# Patient Record
Sex: MALE | Race: BLACK OR AFRICAN AMERICAN | Employment: FULL TIME | URBAN - METROPOLITAN AREA
[De-identification: names, ages, dates, MRNs, and addresses within clinical notes are randomized per-mention and may not be internally consistent; named-entity substitution may affect disease eponyms.]

---

## 2017-05-19 PROBLEM — N32.3 BLADDER DIVERTICULUM: Status: ACTIVE | Noted: 2017-05-19

## 2017-05-19 PROBLEM — N32.0 BLADDER OUTLET OBSTRUCTION: Status: ACTIVE | Noted: 2017-05-19

## 2017-08-10 ENCOUNTER — HOSPITAL ENCOUNTER (EMERGENCY)
Age: 62
Discharge: HOME | End: 2017-08-10
Attending: EMERGENCY MEDICINE
Payer: MEDICARE

## 2017-08-10 VITALS
RESPIRATION RATE: 14 BRPM | HEART RATE: 92 BPM | TEMPERATURE: 98.3 F | SYSTOLIC BLOOD PRESSURE: 105 MMHG | DIASTOLIC BLOOD PRESSURE: 73 MMHG | OXYGEN SATURATION: 96 %

## 2017-08-10 DIAGNOSIS — H57.9 SENSATION OF FOREIGN BODY IN EYE: Primary | ICD-10-CM

## 2017-08-10 PROCEDURE — 99283 EMERGENCY DEPT VISIT LOW MDM: CPT

## 2017-08-10 PROCEDURE — 74011000250 HC RX REV CODE- 250: Performed by: PHYSICIAN ASSISTANT

## 2017-08-10 RX ORDER — PROPARACAINE HYDROCHLORIDE 5 MG/ML
1 SOLUTION/ DROPS OPHTHALMIC
Status: COMPLETED | OUTPATIENT
Start: 2017-08-10 | End: 2017-08-10

## 2017-08-10 RX ORDER — ERYTHROMYCIN 5 MG/G
OINTMENT OPHTHALMIC
Qty: 1 G | Refills: 0 | Status: SHIPPED | OUTPATIENT
Start: 2017-08-10 | End: 2017-08-17

## 2017-08-10 RX ADMIN — PROPARACAINE HYDROCHLORIDE 1 DROP: 5 SOLUTION/ DROPS OPHTHALMIC at 06:25

## 2017-08-10 RX ADMIN — FLUORESCEIN SODIUM 1 STRIP: 1 STRIP OPHTHALMIC at 06:25

## 2017-08-10 NOTE — ED NOTES
I have reviewed discharge instructions with the patient. The patient verbalized understanding. Patient armband removed and shredded Pt walked independently out of Ed with discharge papers and 1 RX.

## 2017-08-10 NOTE — ED PROVIDER NOTES
HPI Comments: 58 yo M c/o FB sensation in right eye x 2 days. 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 Denies vision changes or drainage from eye. No other complaints. Patient is a 57 y.o. male presenting with eye pain. Eye Pain    Associated symptoms include pain. Past Medical History:   Diagnosis Date    Benign prostatic hyperplasia with lower urinary tract symptoms     Bladder diverticulum     Dysmetabolic syndrome X     Head ache     Hypertension     Smoker     Urinary frequency        Past Surgical History:   Procedure Laterality Date    HX UROLOGICAL  05/31/2017    Cysto/transurethral incision of prostate/robotic assisted laparoscopic bladder diverticulectomy by Dr. Bruce Rubio at [facility]         Family History:   Problem Relation Age of Onset    Hypertension Mother     Diabetes Mother     Hypertension Father     Diabetes Father     Diabetes Sister     Hypertension Sister        Social History     Social History    Marital status: SINGLE     Spouse name: N/A    Number of children: N/A    Years of education: N/A     Occupational History    Not on file. Social History Main Topics    Smoking status: Former Smoker     Packs/day: 0.50     Years: 40.00     Quit date: 05/2017    Smokeless tobacco: Never Used    Alcohol use No    Drug use: Not on file    Sexual activity: Not on file     Other Topics Concern    Not on file     Social History Narrative         ALLERGIES: Review of patient's allergies indicates no known allergies. Review of Systems   Eyes: Positive for pain. All other systems reviewed and are negative. Vitals:    08/10/17 0556   BP: 105/73   Pulse: 92   Resp: 14   Temp: 98.3 °F (36.8 °C)   SpO2: 96%            Physical Exam   Constitutional: He is oriented to person, place, and time. He appears well-developed and well-nourished. No distress. HENT:   Head: Normocephalic and atraumatic.    Eyes: Conjunctivae and EOM are normal. Pupils are equal, round, and reactive to light. Right eye exhibits no discharge. No foreign body present in the right eye. Left eye exhibits no discharge. Right conjunctiva is not injected. Left conjunctiva is not injected. Slit lamp exam:       The right eye shows no corneal abrasion and no foreign body. Neck: Normal range of motion. Neck supple. Cardiovascular: Normal rate, regular rhythm and normal heart sounds. Pulmonary/Chest: Effort normal and breath sounds normal. No respiratory distress. He has no wheezes. He has no rales. Musculoskeletal: Normal range of motion. Neurological: He is alert and oriented to person, place, and time. Skin: Skin is warm and dry. Psychiatric: He has a normal mood and affect. His behavior is normal. Judgment and thought content normal.   Nursing note and vitals reviewed. MDM  Number of Diagnoses or Management Options  Sensation of foreign body in eye:     ED Course       Procedures    -------------------------------------------------------------------------------------------------------------------     EKG INTERPRETATIONS:      RADIOLOGY RESULTS:   No orders to display       LABORATORY RESULTS:  No results found for this or any previous visit (from the past 12 hour(s)). CONSULTATIONS:        PROGRESS NOTES:    6:51 AM Pt well appearing and in NAD. No FB or corneal abrasion noted. Will give EES for symptomatic relief. Ophtho f/u if sx persist.     Lengthy D/W pt regarding possible worsening of pt's condition, need for follow up and strict ED return instructions for any worsening symptoms. DISPOSITION:  ED DIAGNOSIS & DISPOSITION INFORMATION  Diagnosis:   1. Sensation of foreign body in eye          Disposition: home    Follow-up Information     Follow up With Details Comments [address]  Call To make a follow up appointment [facility], [address], [address] [place] [address] [facility] Immediately if symptoms worsen [phone] [id number]  [phone]          Patient's Medications   Start Taking    ERYTHROMYCIN (ILOTYCIN) OPHTHALMIC OINTMENT    Apply thin ribbon to lower lid of affected eye TID x 5 days. Continue Taking    ALBUTEROL (VENTOLIN HFA) 90 MCG/ACTUATION INHALER    Take  by inhalation. FINASTERIDE (PROSCAR) 5 MG TABLET    Take 1 Tab by mouth daily. GABAPENTIN (NEURONTIN) 300 MG CAPSULE        LISINOPRIL (PRINIVIL, ZESTRIL) 20 MG TABLET    Take  by mouth daily. MELOXICAM (MOBIC) 15 MG TABLET    Take 15 mg by mouth daily. METFORMIN (GLUCOPHAGE) 500 MG TABLET    Take  by mouth two (2) times daily (with meals). NORTRIPTYLINE (PAMELOR) 25 MG CAPSULE        OXYBUTYNIN (DITROPAN) 5 MG TABLET    Take 1 Tab by mouth three (3) times daily. TAMSULOSIN (FLOMAX) 0.4 MG CAPSULE    Take 1 Cap by mouth daily (after dinner). TIZANIDINE (ZANAFLEX) 2 MG TABLET        TRAMADOL (ULTRAM) 50 MG TABLET    Take 50 mg by mouth every six (6) hours as needed for Pain.    These Medications have changed    No medications on file   Stop Taking    No medications on file

## 2020-11-11 ENCOUNTER — TRANSCRIBE ORDER (OUTPATIENT)
Dept: SCHEDULING | Age: 65
End: 2020-11-11

## 2020-11-11 ENCOUNTER — HOSPITAL ENCOUNTER (OUTPATIENT)
Dept: VASCULAR SURGERY | Age: 65
Discharge: HOME | End: 2020-11-11
Attending: INTERNAL MEDICINE
Payer: MEDICARE

## 2020-11-11 DIAGNOSIS — R22.42 MASS OF LOWER LEG, LEFT: ICD-10-CM

## 2020-11-11 DIAGNOSIS — R60.9 SWELLING: ICD-10-CM

## 2020-11-11 DIAGNOSIS — R22.42 MASS OF LOWER LEG, LEFT: Primary | ICD-10-CM

## 2020-11-11 PROCEDURE — 93971 EXTREMITY STUDY: CPT
